# Patient Record
Sex: FEMALE | Race: WHITE | NOT HISPANIC OR LATINO | Employment: OTHER | URBAN - METROPOLITAN AREA
[De-identification: names, ages, dates, MRNs, and addresses within clinical notes are randomized per-mention and may not be internally consistent; named-entity substitution may affect disease eponyms.]

---

## 2017-01-09 ENCOUNTER — GENERIC CONVERSION - ENCOUNTER (OUTPATIENT)
Dept: OTHER | Facility: OTHER | Age: 62
End: 2017-01-09

## 2017-01-11 ENCOUNTER — GENERIC CONVERSION - ENCOUNTER (OUTPATIENT)
Dept: OTHER | Facility: OTHER | Age: 62
End: 2017-01-11

## 2017-01-12 ENCOUNTER — ALLSCRIPTS OFFICE VISIT (OUTPATIENT)
Dept: OTHER | Facility: OTHER | Age: 62
End: 2017-01-12

## 2017-01-19 ENCOUNTER — LAB REQUISITION (OUTPATIENT)
Dept: LAB | Facility: HOSPITAL | Age: 62
End: 2017-01-19
Payer: MEDICARE

## 2017-01-19 ENCOUNTER — ALLSCRIPTS OFFICE VISIT (OUTPATIENT)
Dept: OTHER | Facility: OTHER | Age: 62
End: 2017-01-19

## 2017-01-19 DIAGNOSIS — Z01.419 ENCOUNTER FOR GYNECOLOGICAL EXAMINATION WITHOUT ABNORMAL FINDING: ICD-10-CM

## 2017-01-19 PROCEDURE — G0145 SCR C/V CYTO,THINLAYER,RESCR: HCPCS | Performed by: OBSTETRICS & GYNECOLOGY

## 2017-01-23 LAB
LAB AP GYN PRIMARY INTERPRETATION: NORMAL
Lab: NORMAL

## 2017-01-24 ENCOUNTER — GENERIC CONVERSION - ENCOUNTER (OUTPATIENT)
Dept: OTHER | Facility: OTHER | Age: 62
End: 2017-01-24

## 2017-02-08 ENCOUNTER — GENERIC CONVERSION - ENCOUNTER (OUTPATIENT)
Dept: OTHER | Facility: OTHER | Age: 62
End: 2017-02-08

## 2017-03-08 ENCOUNTER — TRANSCRIBE ORDERS (OUTPATIENT)
Dept: ADMINISTRATIVE | Facility: HOSPITAL | Age: 62
End: 2017-03-08

## 2017-03-08 DIAGNOSIS — Z85.9 HISTORY OF CANCER: Primary | ICD-10-CM

## 2017-03-13 ENCOUNTER — HOSPITAL ENCOUNTER (OUTPATIENT)
Dept: CT IMAGING | Facility: HOSPITAL | Age: 62
Discharge: HOME | End: 2017-03-13
Payer: MEDICARE

## 2017-03-13 DIAGNOSIS — Z85.9 HISTORY OF CANCER: ICD-10-CM

## 2017-03-13 PROCEDURE — 71260 CT THORAX DX C+: CPT

## 2017-03-13 PROCEDURE — 74177 CT ABD & PELVIS W/CONTRAST: CPT

## 2017-03-13 RX ADMIN — IOHEXOL 100 ML: 350 INJECTION, SOLUTION INTRAVENOUS at 10:21

## 2017-03-16 ENCOUNTER — TRANSCRIBE ORDERS (OUTPATIENT)
Dept: LAB | Facility: CLINIC | Age: 62
End: 2017-03-16

## 2017-03-16 ENCOUNTER — APPOINTMENT (OUTPATIENT)
Dept: LAB | Facility: CLINIC | Age: 62
End: 2017-03-16
Payer: MEDICARE

## 2017-03-16 ENCOUNTER — OFFICE VISIT (OUTPATIENT)
Dept: LAB | Facility: CLINIC | Age: 62
End: 2017-03-16
Payer: MEDICARE

## 2017-03-16 ENCOUNTER — ALLSCRIPTS OFFICE VISIT (OUTPATIENT)
Dept: OTHER | Facility: OTHER | Age: 62
End: 2017-03-16

## 2017-03-16 DIAGNOSIS — C48.1 MALIGNANT NEOPLASM OF SPECIFIED PARTS OF PERITONEUM (HCC): Primary | ICD-10-CM

## 2017-03-16 DIAGNOSIS — C48.1 MALIGNANT NEOPLASM OF SPECIFIED PARTS OF PERITONEUM (HCC): ICD-10-CM

## 2017-03-16 DIAGNOSIS — C48.2 MALIGNANT NEOPLASM OF PERITONEUM (HCC): ICD-10-CM

## 2017-03-16 DIAGNOSIS — C48.2 MALIGNANT NEOPLASM OF PERITONEUM, UNSPECIFIED (HCC): ICD-10-CM

## 2017-03-16 DIAGNOSIS — E03.9 HYPOTHYROIDISM: ICD-10-CM

## 2017-03-16 LAB
ALBUMIN SERPL BCP-MCNC: 4.1 G/DL (ref 3.5–5)
ALP SERPL-CCNC: 122 U/L (ref 46–116)
ALT SERPL W P-5'-P-CCNC: 61 U/L (ref 12–78)
ANION GAP SERPL CALCULATED.3IONS-SCNC: 12 MMOL/L (ref 4–13)
AST SERPL W P-5'-P-CCNC: 27 U/L (ref 5–45)
ATRIAL RATE: 95 BPM
BACTERIA UR QL AUTO: ABNORMAL /HPF
BASOPHILS # BLD AUTO: 0.01 THOUSANDS/ΜL (ref 0–0.1)
BASOPHILS NFR BLD AUTO: 0 % (ref 0–1)
BILIRUB SERPL-MCNC: 0.2 MG/DL (ref 0.2–1)
BILIRUB UR QL STRIP: NEGATIVE
BUN SERPL-MCNC: 20 MG/DL (ref 5–25)
CALCIUM SERPL-MCNC: 9.6 MG/DL (ref 8.3–10.1)
CANCER AG125 SERPL-ACNC: 62 U/ML (ref 0–30)
CHLORIDE SERPL-SCNC: 102 MMOL/L (ref 100–108)
CLARITY UR: ABNORMAL
CO2 SERPL-SCNC: 26 MMOL/L (ref 21–32)
COLOR UR: YELLOW
CREAT SERPL-MCNC: 0.78 MG/DL (ref 0.6–1.3)
CREAT UR-MCNC: 138 MG/DL
EOSINOPHIL # BLD AUTO: 0.03 THOUSAND/ΜL (ref 0–0.61)
EOSINOPHIL NFR BLD AUTO: 1 % (ref 0–6)
ERYTHROCYTE [DISTWIDTH] IN BLOOD BY AUTOMATED COUNT: 16.1 % (ref 11.6–15.1)
GFR SERPL CREATININE-BSD FRML MDRD: >60 ML/MIN/1.73SQ M
GLUCOSE SERPL-MCNC: 106 MG/DL (ref 65–140)
GLUCOSE UR STRIP-MCNC: NEGATIVE MG/DL
HCT VFR BLD AUTO: 36.4 % (ref 34.8–46.1)
HGB BLD-MCNC: 11.9 G/DL (ref 11.5–15.4)
HGB UR QL STRIP.AUTO: NEGATIVE
KETONES UR STRIP-MCNC: NEGATIVE MG/DL
LEUKOCYTE ESTERASE UR QL STRIP: ABNORMAL
LYMPHOCYTES # BLD AUTO: 1.56 THOUSANDS/ΜL (ref 0.6–4.47)
LYMPHOCYTES NFR BLD AUTO: 30 % (ref 14–44)
MCH RBC QN AUTO: 34.2 PG (ref 26.8–34.3)
MCHC RBC AUTO-ENTMCNC: 32.7 G/DL (ref 31.4–37.4)
MCV RBC AUTO: 105 FL (ref 82–98)
MONOCYTES # BLD AUTO: 0.59 THOUSAND/ΜL (ref 0.17–1.22)
MONOCYTES NFR BLD AUTO: 11 % (ref 4–12)
NEUTROPHILS # BLD AUTO: 2.98 THOUSANDS/ΜL (ref 1.85–7.62)
NEUTS SEG NFR BLD AUTO: 58 % (ref 43–75)
NITRITE UR QL STRIP: POSITIVE
NON-SQ EPI CELLS URNS QL MICRO: ABNORMAL /HPF
P AXIS: 34 DEGREES
PH UR STRIP.AUTO: 5 [PH] (ref 4.5–8)
PLATELET # BLD AUTO: 173 THOUSANDS/UL (ref 149–390)
PMV BLD AUTO: 10.7 FL (ref 8.9–12.7)
POTASSIUM SERPL-SCNC: 4 MMOL/L (ref 3.5–5.3)
PR INTERVAL: 140 MS
PROT SERPL-MCNC: 7.8 G/DL (ref 6.4–8.2)
PROT UR STRIP-MCNC: NEGATIVE MG/DL
PROT UR-MCNC: 17 MG/DL
PROT/CREAT UR: 0.12 MG/G{CREAT} (ref 0–0.1)
QRS AXIS: 39 DEGREES
QRSD INTERVAL: 76 MS
QT INTERVAL: 338 MS
QTC INTERVAL: 424 MS
RBC # BLD AUTO: 3.48 MILLION/UL (ref 3.81–5.12)
RBC #/AREA URNS AUTO: ABNORMAL /HPF
SODIUM SERPL-SCNC: 140 MMOL/L (ref 136–145)
SP GR UR STRIP.AUTO: 1.02 (ref 1–1.03)
T WAVE AXIS: 35 DEGREES
TSH SERPL DL<=0.05 MIU/L-ACNC: 4.44 UIU/ML (ref 0.36–3.74)
UROBILINOGEN UR QL STRIP.AUTO: 0.2 E.U./DL
VENTRICULAR RATE: 95 BPM
WBC # BLD AUTO: 5.17 THOUSAND/UL (ref 4.31–10.16)
WBC #/AREA URNS AUTO: ABNORMAL /HPF
WBC CLUMPS # UR AUTO: ABNORMAL /UL

## 2017-03-16 PROCEDURE — 93005 ELECTROCARDIOGRAM TRACING: CPT

## 2017-03-16 PROCEDURE — 86304 IMMUNOASSAY TUMOR CA 125: CPT

## 2017-03-16 PROCEDURE — 84156 ASSAY OF PROTEIN URINE: CPT

## 2017-03-16 PROCEDURE — 85025 COMPLETE CBC W/AUTO DIFF WBC: CPT

## 2017-03-16 PROCEDURE — 82570 ASSAY OF URINE CREATININE: CPT

## 2017-03-16 PROCEDURE — 81001 URINALYSIS AUTO W/SCOPE: CPT

## 2017-03-16 PROCEDURE — 84443 ASSAY THYROID STIM HORMONE: CPT

## 2017-03-16 PROCEDURE — 80053 COMPREHEN METABOLIC PANEL: CPT

## 2017-03-16 PROCEDURE — 36415 COLL VENOUS BLD VENIPUNCTURE: CPT

## 2017-03-22 ENCOUNTER — ALLSCRIPTS OFFICE VISIT (OUTPATIENT)
Dept: OTHER | Facility: OTHER | Age: 62
End: 2017-03-22

## 2017-05-15 ENCOUNTER — HOSPITAL ENCOUNTER (INPATIENT)
Facility: HOSPITAL | Age: 62
LOS: 2 days | Discharge: HOME | DRG: 389 | End: 2017-05-17
Attending: OBSTETRICS & GYNECOLOGY | Admitting: OBSTETRICS & GYNECOLOGY
Payer: MEDICARE

## 2017-05-15 LAB
ALBUMIN SERPL BCP-MCNC: 3 G/DL (ref 3.5–5)
ALP SERPL-CCNC: 96 U/L (ref 46–116)
ALT SERPL W P-5'-P-CCNC: 43 U/L (ref 12–78)
ANION GAP SERPL CALCULATED.3IONS-SCNC: 9 MMOL/L (ref 4–13)
AST SERPL W P-5'-P-CCNC: 19 U/L (ref 5–45)
BASOPHILS # BLD AUTO: 0.01 THOUSANDS/ΜL (ref 0–0.1)
BASOPHILS NFR BLD AUTO: 0 % (ref 0–1)
BILIRUB SERPL-MCNC: 0.27 MG/DL (ref 0.2–1)
BUN SERPL-MCNC: 7 MG/DL (ref 5–25)
CALCIUM SERPL-MCNC: 8.2 MG/DL (ref 8.3–10.1)
CHLORIDE SERPL-SCNC: 106 MMOL/L (ref 100–108)
CO2 SERPL-SCNC: 24 MMOL/L (ref 21–32)
CREAT SERPL-MCNC: 0.63 MG/DL (ref 0.6–1.3)
EOSINOPHIL # BLD AUTO: 0.05 THOUSAND/ΜL (ref 0–0.61)
EOSINOPHIL NFR BLD AUTO: 1 % (ref 0–6)
ERYTHROCYTE [DISTWIDTH] IN BLOOD BY AUTOMATED COUNT: 13.3 % (ref 11.6–15.1)
GFR SERPL CREATININE-BSD FRML MDRD: >60 ML/MIN/1.73SQ M
GLUCOSE SERPL-MCNC: 89 MG/DL (ref 65–140)
HCT VFR BLD AUTO: 38.5 % (ref 34.8–46.1)
HGB BLD-MCNC: 12.9 G/DL (ref 11.5–15.4)
LACTATE SERPL-SCNC: 1 MMOL/L (ref 0.5–2)
LIPASE SERPL-CCNC: 148 U/L (ref 73–393)
LYMPHOCYTES # BLD AUTO: 1.4 THOUSANDS/ΜL (ref 0.6–4.47)
LYMPHOCYTES NFR BLD AUTO: 27 % (ref 14–44)
MAGNESIUM SERPL-MCNC: 2 MG/DL (ref 1.6–2.6)
MCH RBC QN AUTO: 33.4 PG (ref 26.8–34.3)
MCHC RBC AUTO-ENTMCNC: 33.5 G/DL (ref 31.4–37.4)
MCV RBC AUTO: 100 FL (ref 82–98)
MONOCYTES # BLD AUTO: 1.31 THOUSAND/ΜL (ref 0.17–1.22)
MONOCYTES NFR BLD AUTO: 25 % (ref 4–12)
NEUTROPHILS # BLD AUTO: 2.39 THOUSANDS/ΜL (ref 1.85–7.62)
NEUTS SEG NFR BLD AUTO: 47 % (ref 43–75)
NRBC BLD AUTO-RTO: 0 /100 WBCS
PHOSPHATE SERPL-MCNC: 2.3 MG/DL (ref 2.3–4.1)
PLATELET # BLD AUTO: 149 THOUSANDS/UL (ref 149–390)
PMV BLD AUTO: 11 FL (ref 8.9–12.7)
POTASSIUM SERPL-SCNC: 3.8 MMOL/L (ref 3.5–5.3)
PROT SERPL-MCNC: 6.7 G/DL (ref 6.4–8.2)
RBC # BLD AUTO: 3.86 MILLION/UL (ref 3.81–5.12)
SODIUM SERPL-SCNC: 139 MMOL/L (ref 136–145)
WBC # BLD AUTO: 5.18 THOUSAND/UL (ref 4.31–10.16)

## 2017-05-15 PROCEDURE — 84100 ASSAY OF PHOSPHORUS: CPT | Performed by: OBSTETRICS & GYNECOLOGY

## 2017-05-15 PROCEDURE — 83605 ASSAY OF LACTIC ACID: CPT | Performed by: OBSTETRICS & GYNECOLOGY

## 2017-05-15 PROCEDURE — 83690 ASSAY OF LIPASE: CPT | Performed by: OBSTETRICS & GYNECOLOGY

## 2017-05-15 PROCEDURE — 80053 COMPREHEN METABOLIC PANEL: CPT | Performed by: OBSTETRICS & GYNECOLOGY

## 2017-05-15 PROCEDURE — 83735 ASSAY OF MAGNESIUM: CPT | Performed by: OBSTETRICS & GYNECOLOGY

## 2017-05-15 PROCEDURE — 85025 COMPLETE CBC W/AUTO DIFF WBC: CPT | Performed by: OBSTETRICS & GYNECOLOGY

## 2017-05-15 RX ORDER — ONDANSETRON 2 MG/ML
4 INJECTION INTRAMUSCULAR; INTRAVENOUS EVERY 4 HOURS PRN
Status: DISCONTINUED | OUTPATIENT
Start: 2017-05-15 | End: 2017-05-17 | Stop reason: HOSPADM

## 2017-05-15 RX ORDER — LORAZEPAM 2 MG/ML
0.5 INJECTION INTRAMUSCULAR 2 TIMES DAILY PRN
Status: DISCONTINUED | OUTPATIENT
Start: 2017-05-15 | End: 2017-05-17 | Stop reason: HOSPADM

## 2017-05-15 RX ORDER — VALSARTAN 40 MG/1
40 TABLET ORAL DAILY
COMMUNITY

## 2017-05-15 RX ORDER — DEXTROSE, SODIUM CHLORIDE, AND POTASSIUM CHLORIDE 5; .45; .15 G/100ML; G/100ML; G/100ML
125 INJECTION INTRAVENOUS CONTINUOUS
Status: DISCONTINUED | OUTPATIENT
Start: 2017-05-15 | End: 2017-05-17

## 2017-05-15 RX ORDER — PANTOPRAZOLE SODIUM 40 MG/1
40 INJECTION, POWDER, FOR SOLUTION INTRAVENOUS
Status: DISCONTINUED | OUTPATIENT
Start: 2017-05-15 | End: 2017-05-17 | Stop reason: ALTCHOICE

## 2017-05-15 RX ADMIN — DEXTROSE, SODIUM CHLORIDE, AND POTASSIUM CHLORIDE 125 ML/HR: 5; .45; .15 INJECTION INTRAVENOUS at 20:32

## 2017-05-16 ENCOUNTER — APPOINTMENT (INPATIENT)
Dept: RADIOLOGY | Facility: HOSPITAL | Age: 62
DRG: 389 | End: 2017-05-16
Payer: MEDICARE

## 2017-05-16 PROBLEM — Z87.19 HISTORY OF BARRETT'S ESOPHAGUS: Status: ACTIVE | Noted: 2017-05-16

## 2017-05-16 PROBLEM — C48.2 PRIMARY MALIGNANT NEOPLASM OF PERITONEUM (HCC): Status: ACTIVE | Noted: 2017-05-16

## 2017-05-16 PROBLEM — K56.609 SMALL BOWEL OBSTRUCTION (HCC): Status: ACTIVE | Noted: 2017-05-16

## 2017-05-16 PROBLEM — E78.5 HYPERLIPIDEMIA: Status: ACTIVE | Noted: 2017-05-16

## 2017-05-16 PROBLEM — R97.1 ELEVATED CANCER ANTIGEN 125 (CA-125): Status: ACTIVE | Noted: 2017-05-16

## 2017-05-16 PROBLEM — E03.9 ACQUIRED HYPOTHYROIDISM: Status: ACTIVE | Noted: 2017-05-16

## 2017-05-16 PROBLEM — C78.6 PERITONEAL CARCINOMATOSIS (HCC): Status: ACTIVE | Noted: 2017-05-16

## 2017-05-16 LAB
ANION GAP SERPL CALCULATED.3IONS-SCNC: 8 MMOL/L (ref 4–13)
BASOPHILS # BLD AUTO: 0.01 THOUSANDS/ΜL (ref 0–0.1)
BASOPHILS NFR BLD AUTO: 0 % (ref 0–1)
BUN SERPL-MCNC: 8 MG/DL (ref 5–25)
CALCIUM SERPL-MCNC: 8.2 MG/DL (ref 8.3–10.1)
CHLORIDE SERPL-SCNC: 109 MMOL/L (ref 100–108)
CO2 SERPL-SCNC: 25 MMOL/L (ref 21–32)
CREAT SERPL-MCNC: 0.7 MG/DL (ref 0.6–1.3)
EOSINOPHIL # BLD AUTO: 0.04 THOUSAND/ΜL (ref 0–0.61)
EOSINOPHIL NFR BLD AUTO: 1 % (ref 0–6)
ERYTHROCYTE [DISTWIDTH] IN BLOOD BY AUTOMATED COUNT: 13.4 % (ref 11.6–15.1)
GFR SERPL CREATININE-BSD FRML MDRD: >60 ML/MIN/1.73SQ M
GLUCOSE SERPL-MCNC: 92 MG/DL (ref 65–140)
HCT VFR BLD AUTO: 37.5 % (ref 34.8–46.1)
HGB BLD-MCNC: 12.4 G/DL (ref 11.5–15.4)
LYMPHOCYTES # BLD AUTO: 1.68 THOUSANDS/ΜL (ref 0.6–4.47)
LYMPHOCYTES NFR BLD AUTO: 35 % (ref 14–44)
MCH RBC QN AUTO: 33.2 PG (ref 26.8–34.3)
MCHC RBC AUTO-ENTMCNC: 33.1 G/DL (ref 31.4–37.4)
MCV RBC AUTO: 101 FL (ref 82–98)
MONOCYTES # BLD AUTO: 1.11 THOUSAND/ΜL (ref 0.17–1.22)
MONOCYTES NFR BLD AUTO: 23 % (ref 4–12)
NEUTROPHILS # BLD AUTO: 1.99 THOUSANDS/ΜL (ref 1.85–7.62)
NEUTS SEG NFR BLD AUTO: 41 % (ref 43–75)
NRBC BLD AUTO-RTO: 0 /100 WBCS
PLATELET # BLD AUTO: 163 THOUSANDS/UL (ref 149–390)
PMV BLD AUTO: 11.4 FL (ref 8.9–12.7)
POTASSIUM SERPL-SCNC: 4.2 MMOL/L (ref 3.5–5.3)
RBC # BLD AUTO: 3.73 MILLION/UL (ref 3.81–5.12)
SODIUM SERPL-SCNC: 142 MMOL/L (ref 136–145)
WBC # BLD AUTO: 4.85 THOUSAND/UL (ref 4.31–10.16)

## 2017-05-16 PROCEDURE — C9113 INJ PANTOPRAZOLE SODIUM, VIA: HCPCS | Performed by: OBSTETRICS & GYNECOLOGY

## 2017-05-16 PROCEDURE — 80048 BASIC METABOLIC PNL TOTAL CA: CPT | Performed by: OBSTETRICS & GYNECOLOGY

## 2017-05-16 PROCEDURE — 49440 PLACE GASTROSTOMY TUBE PERC: CPT

## 2017-05-16 PROCEDURE — C1769 GUIDE WIRE: HCPCS

## 2017-05-16 PROCEDURE — 85025 COMPLETE CBC W/AUTO DIFF WBC: CPT | Performed by: OBSTETRICS & GYNECOLOGY

## 2017-05-16 RX ORDER — FONDAPARINUX SODIUM 2.5 MG/.5ML
2.5 INJECTION SUBCUTANEOUS EVERY 24 HOURS
Status: DISCONTINUED | OUTPATIENT
Start: 2017-05-16 | End: 2017-05-17 | Stop reason: HOSPADM

## 2017-05-16 RX ORDER — FENTANYL CITRATE 50 UG/ML
INJECTION, SOLUTION INTRAMUSCULAR; INTRAVENOUS CODE/TRAUMA/SEDATION MEDICATION
Status: COMPLETED | OUTPATIENT
Start: 2017-05-16 | End: 2017-05-16

## 2017-05-16 RX ORDER — MIDAZOLAM HYDROCHLORIDE 1 MG/ML
INJECTION INTRAMUSCULAR; INTRAVENOUS CODE/TRAUMA/SEDATION MEDICATION
Status: COMPLETED | OUTPATIENT
Start: 2017-05-16 | End: 2017-05-16

## 2017-05-16 RX ORDER — DIPHENHYDRAMINE HYDROCHLORIDE 50 MG/ML
INJECTION INTRAMUSCULAR; INTRAVENOUS CODE/TRAUMA/SEDATION MEDICATION
Status: COMPLETED | OUTPATIENT
Start: 2017-05-16 | End: 2017-05-16

## 2017-05-16 RX ORDER — CLINDAMYCIN PHOSPHATE 600 MG/50ML
600 INJECTION INTRAVENOUS ONCE
Status: COMPLETED | OUTPATIENT
Start: 2017-05-16 | End: 2017-05-16

## 2017-05-16 RX ADMIN — DEXTROSE, SODIUM CHLORIDE, AND POTASSIUM CHLORIDE 125 ML/HR: 5; .45; .15 INJECTION INTRAVENOUS at 02:35

## 2017-05-16 RX ADMIN — DEXTROSE, SODIUM CHLORIDE, AND POTASSIUM CHLORIDE 125 ML/HR: 5; .45; .15 INJECTION INTRAVENOUS at 19:16

## 2017-05-16 RX ADMIN — DEXTROSE, SODIUM CHLORIDE, AND POTASSIUM CHLORIDE 125 ML/HR: 5; .45; .15 INJECTION INTRAVENOUS at 10:11

## 2017-05-16 RX ADMIN — FENTANYL CITRATE 50 MCG: 50 INJECTION, SOLUTION INTRAMUSCULAR; INTRAVENOUS at 17:19

## 2017-05-16 RX ADMIN — MIDAZOLAM HYDROCHLORIDE 1 MG: 1 INJECTION, SOLUTION INTRAMUSCULAR; INTRAVENOUS at 17:19

## 2017-05-16 RX ADMIN — CLINDAMYCIN PHOSPHATE 600 MG: 12 INJECTION, SOLUTION INTRAMUSCULAR; INTRAVENOUS at 17:56

## 2017-05-16 RX ADMIN — GLUCAGON HYDROCHLORIDE 1 MG: KIT at 17:01

## 2017-05-16 RX ADMIN — MIDAZOLAM HYDROCHLORIDE 1 MG: 1 INJECTION, SOLUTION INTRAMUSCULAR; INTRAVENOUS at 17:23

## 2017-05-16 RX ADMIN — DIPHENHYDRAMINE HYDROCHLORIDE 25 MG: 50 INJECTION, SOLUTION INTRAMUSCULAR; INTRAVENOUS at 17:20

## 2017-05-16 RX ADMIN — FENTANYL CITRATE 50 MCG: 50 INJECTION, SOLUTION INTRAMUSCULAR; INTRAVENOUS at 17:31

## 2017-05-16 RX ADMIN — MIDAZOLAM HYDROCHLORIDE 1 MG: 1 INJECTION, SOLUTION INTRAMUSCULAR; INTRAVENOUS at 17:06

## 2017-05-16 RX ADMIN — FONDAPARINUX SODIUM 2.5 MG: 2.5 INJECTION, SOLUTION SUBCUTANEOUS at 21:50

## 2017-05-16 RX ADMIN — HYDROMORPHONE HYDROCHLORIDE 1 MG: 1 INJECTION, SOLUTION INTRAMUSCULAR; INTRAVENOUS; SUBCUTANEOUS at 21:56

## 2017-05-16 RX ADMIN — PANTOPRAZOLE SODIUM 40 MG: 40 INJECTION, POWDER, FOR SOLUTION INTRAVENOUS at 09:56

## 2017-05-16 RX ADMIN — FENTANYL CITRATE 50 MCG: 50 INJECTION, SOLUTION INTRAMUSCULAR; INTRAVENOUS at 17:06

## 2017-05-16 RX ADMIN — ONDANSETRON 4 MG: 2 INJECTION INTRAMUSCULAR; INTRAVENOUS at 10:33

## 2017-05-16 RX ADMIN — MIDAZOLAM HYDROCHLORIDE 1 MG: 1 INJECTION, SOLUTION INTRAMUSCULAR; INTRAVENOUS at 17:00

## 2017-05-16 RX ADMIN — HYDROMORPHONE HYDROCHLORIDE 1 MG: 1 INJECTION, SOLUTION INTRAMUSCULAR; INTRAVENOUS; SUBCUTANEOUS at 19:08

## 2017-05-16 RX ADMIN — ONDANSETRON 4 MG: 2 INJECTION INTRAMUSCULAR; INTRAVENOUS at 04:51

## 2017-05-16 RX ADMIN — IOHEXOL 5 ML: 300 INJECTION, SOLUTION INTRAVENOUS at 17:43

## 2017-05-16 RX ADMIN — FENTANYL CITRATE 50 MCG: 50 INJECTION, SOLUTION INTRAMUSCULAR; INTRAVENOUS at 17:00

## 2017-05-17 VITALS
DIASTOLIC BLOOD PRESSURE: 82 MMHG | RESPIRATION RATE: 20 BRPM | OXYGEN SATURATION: 98 % | HEART RATE: 113 BPM | SYSTOLIC BLOOD PRESSURE: 138 MMHG | WEIGHT: 167.77 LBS | BODY MASS INDEX: 29.73 KG/M2 | HEIGHT: 63 IN | TEMPERATURE: 98 F

## 2017-05-17 PROBLEM — K56.609 SMALL BOWEL OBSTRUCTION (HCC): Status: RESOLVED | Noted: 2017-05-16 | Resolved: 2017-05-17

## 2017-05-17 PROBLEM — C56.9 OVARIAN CA (HCC): Status: ACTIVE | Noted: 2017-05-17

## 2017-05-17 PROBLEM — Z93.4 JEJUNOSTOMY TUBE PRESENT (HCC): Status: ACTIVE | Noted: 2017-05-17

## 2017-05-17 PROBLEM — F41.9 ANXIETY: Chronic | Status: ACTIVE | Noted: 2017-05-17

## 2017-05-17 PROBLEM — Z93.1 GASTROINTESTINAL TUBE PRESENT (HCC): Status: ACTIVE | Noted: 2017-05-17

## 2017-05-17 PROCEDURE — 0DH67UZ INSERTION OF FEEDING DEVICE INTO STOMACH, VIA NATURAL OR ARTIFICIAL OPENING: ICD-10-PCS | Performed by: OBSTETRICS & GYNECOLOGY

## 2017-05-17 PROCEDURE — C9113 INJ PANTOPRAZOLE SODIUM, VIA: HCPCS | Performed by: OBSTETRICS & GYNECOLOGY

## 2017-05-17 RX ORDER — ACETAMINOPHEN 325 MG/1
325 TABLET ORAL EVERY 4 HOURS PRN
Status: DISCONTINUED | OUTPATIENT
Start: 2017-05-17 | End: 2017-05-17 | Stop reason: HOSPADM

## 2017-05-17 RX ORDER — SPIRONOLACTONE 50 MG/1
50 TABLET, FILM COATED ORAL DAILY
Status: DISCONTINUED | OUTPATIENT
Start: 2017-05-17 | End: 2017-05-17 | Stop reason: HOSPADM

## 2017-05-17 RX ORDER — PANTOPRAZOLE SODIUM 40 MG/1
40 TABLET, DELAYED RELEASE ORAL
Status: DISCONTINUED | OUTPATIENT
Start: 2017-05-17 | End: 2017-05-17 | Stop reason: HOSPADM

## 2017-05-17 RX ORDER — METOPROLOL SUCCINATE 50 MG/1
50 TABLET, EXTENDED RELEASE ORAL DAILY
Status: DISCONTINUED | OUTPATIENT
Start: 2017-05-17 | End: 2017-05-17 | Stop reason: HOSPADM

## 2017-05-17 RX ORDER — GABAPENTIN 400 MG/1
400 CAPSULE ORAL 3 TIMES DAILY
Status: DISCONTINUED | OUTPATIENT
Start: 2017-05-17 | End: 2017-05-17 | Stop reason: HOSPADM

## 2017-05-17 RX ORDER — OXYCODONE HYDROCHLORIDE AND ACETAMINOPHEN 5; 325 MG/1; MG/1
1 TABLET ORAL EVERY 4 HOURS PRN
Status: DISCONTINUED | OUTPATIENT
Start: 2017-05-17 | End: 2017-05-17 | Stop reason: HOSPADM

## 2017-05-17 RX ORDER — DOCUSATE SODIUM 100 MG/1
100 CAPSULE, LIQUID FILLED ORAL 2 TIMES DAILY
Status: DISCONTINUED | OUTPATIENT
Start: 2017-05-17 | End: 2017-05-17 | Stop reason: HOSPADM

## 2017-05-17 RX ORDER — OXYCODONE HYDROCHLORIDE AND ACETAMINOPHEN 5; 325 MG/1; MG/1
1 TABLET ORAL EVERY 4 HOURS PRN
Qty: 20 TABLET | Refills: 0 | Status: SHIPPED | OUTPATIENT
Start: 2017-05-17

## 2017-05-17 RX ORDER — CYCLOBENZAPRINE HCL 5 MG
5 TABLET ORAL 3 TIMES DAILY PRN
Status: DISCONTINUED | OUTPATIENT
Start: 2017-05-17 | End: 2017-05-17 | Stop reason: HOSPADM

## 2017-05-17 RX ORDER — VALSARTAN 40 MG/1
40 TABLET ORAL DAILY
Status: DISCONTINUED | OUTPATIENT
Start: 2017-05-17 | End: 2017-05-17 | Stop reason: HOSPADM

## 2017-05-17 RX ORDER — LORAZEPAM 0.5 MG/1
0.5 TABLET ORAL EVERY 6 HOURS PRN
Status: DISCONTINUED | OUTPATIENT
Start: 2017-05-17 | End: 2017-05-17 | Stop reason: HOSPADM

## 2017-05-17 RX ORDER — MELOXICAM 7.5 MG/1
15 TABLET ORAL DAILY
Status: DISCONTINUED | OUTPATIENT
Start: 2017-05-17 | End: 2017-05-17 | Stop reason: HOSPADM

## 2017-05-17 RX ADMIN — MELOXICAM 15 MG: 7.5 TABLET ORAL at 10:45

## 2017-05-17 RX ADMIN — OXYCODONE HYDROCHLORIDE AND ACETAMINOPHEN 1 TABLET: 5; 325 TABLET ORAL at 12:31

## 2017-05-17 RX ADMIN — OXYCODONE HYDROCHLORIDE AND ACETAMINOPHEN 1 TABLET: 5; 325 TABLET ORAL at 14:42

## 2017-05-17 RX ADMIN — ONDANSETRON 4 MG: 2 INJECTION INTRAMUSCULAR; INTRAVENOUS at 01:52

## 2017-05-17 RX ADMIN — METOPROLOL SUCCINATE 50 MG: 50 TABLET, EXTENDED RELEASE ORAL at 10:45

## 2017-05-17 RX ADMIN — SPIRONOLACTONE 50 MG: 50 TABLET ORAL at 10:45

## 2017-05-17 RX ADMIN — DEXTROSE, SODIUM CHLORIDE, AND POTASSIUM CHLORIDE 125 ML/HR: 5; .45; .15 INJECTION INTRAVENOUS at 03:13

## 2017-05-17 RX ADMIN — HYDROMORPHONE HYDROCHLORIDE 1 MG: 1 INJECTION, SOLUTION INTRAMUSCULAR; INTRAVENOUS; SUBCUTANEOUS at 07:44

## 2017-05-17 RX ADMIN — ONDANSETRON 4 MG: 2 INJECTION INTRAMUSCULAR; INTRAVENOUS at 07:45

## 2017-05-17 RX ADMIN — HYDROMORPHONE HYDROCHLORIDE 1 MG: 1 INJECTION, SOLUTION INTRAMUSCULAR; INTRAVENOUS; SUBCUTANEOUS at 01:46

## 2017-05-17 RX ADMIN — Medication 500 UNITS: at 14:42

## 2017-05-17 RX ADMIN — PANTOPRAZOLE SODIUM 40 MG: 40 INJECTION, POWDER, FOR SOLUTION INTRAVENOUS at 08:00

## 2017-05-18 ENCOUNTER — GENERIC CONVERSION - ENCOUNTER (OUTPATIENT)
Dept: OTHER | Facility: OTHER | Age: 62
End: 2017-05-18

## 2017-05-24 ENCOUNTER — GENERIC CONVERSION - ENCOUNTER (OUTPATIENT)
Dept: OTHER | Facility: OTHER | Age: 62
End: 2017-05-24

## 2017-05-25 ENCOUNTER — HOSPITAL ENCOUNTER (OUTPATIENT)
Dept: INTERVENTIONAL RADIOLOGY/VASCULAR | Facility: HOSPITAL | Age: 62
Discharge: HOME | End: 2017-05-25
Payer: MEDICARE

## 2017-05-25 ENCOUNTER — GENERIC CONVERSION - ENCOUNTER (OUTPATIENT)
Dept: OTHER | Facility: OTHER | Age: 62
End: 2017-05-25

## 2017-05-25 DIAGNOSIS — K56.609 SMALL BOWEL OBSTRUCTION (HCC): ICD-10-CM

## 2017-06-13 ENCOUNTER — ALLSCRIPTS OFFICE VISIT (OUTPATIENT)
Dept: OTHER | Facility: OTHER | Age: 62
End: 2017-06-13

## 2017-06-13 DIAGNOSIS — R10.12 LEFT UPPER QUADRANT PAIN: ICD-10-CM

## 2017-06-13 DIAGNOSIS — E07.9 DISORDER OF THYROID: ICD-10-CM

## 2017-06-19 ENCOUNTER — HOSPITAL ENCOUNTER (OUTPATIENT)
Dept: RADIOLOGY | Facility: HOSPITAL | Age: 62
Discharge: HOME | End: 2017-06-19
Attending: INTERNAL MEDICINE
Payer: MEDICARE

## 2017-06-19 ENCOUNTER — ALLSCRIPTS OFFICE VISIT (OUTPATIENT)
Dept: OTHER | Facility: OTHER | Age: 62
End: 2017-06-19

## 2017-06-19 DIAGNOSIS — R10.12 LEFT UPPER QUADRANT PAIN: ICD-10-CM

## 2017-06-19 PROCEDURE — 74240 X-RAY XM UPR GI TRC 1CNTRST: CPT

## 2017-06-20 ENCOUNTER — ALLSCRIPTS OFFICE VISIT (OUTPATIENT)
Dept: OTHER | Facility: OTHER | Age: 62
End: 2017-06-20

## 2017-06-23 ENCOUNTER — ALLSCRIPTS OFFICE VISIT (OUTPATIENT)
Dept: OTHER | Facility: OTHER | Age: 62
End: 2017-06-23

## 2017-08-02 ENCOUNTER — GENERIC CONVERSION - ENCOUNTER (OUTPATIENT)
Dept: OTHER | Facility: OTHER | Age: 62
End: 2017-08-02

## 2017-08-21 ENCOUNTER — GENERIC CONVERSION - ENCOUNTER (OUTPATIENT)
Dept: OTHER | Facility: OTHER | Age: 62
End: 2017-08-21

## 2017-08-28 ENCOUNTER — GENERIC CONVERSION - ENCOUNTER (OUTPATIENT)
Dept: OTHER | Facility: OTHER | Age: 62
End: 2017-08-28

## 2017-12-07 DIAGNOSIS — R92.2 INCONCLUSIVE MAMMOGRAM: ICD-10-CM

## 2017-12-07 DIAGNOSIS — Z12.31 ENCOUNTER FOR SCREENING MAMMOGRAM FOR MALIGNANT NEOPLASM OF BREAST: ICD-10-CM

## 2018-01-09 NOTE — MISCELLANEOUS
Assessment    1  Pleural effusion (511 9) (J90)   2  Benign essential hypertension (401 1) (I10)   3  Cervical disc herniation (722 0) (M50 20)   4  Hyperlipidemia (272 4) (E78 5)   5  Hypertension (401 9) (I10)   6  Thyroid nodule (241 0) (E04 1)    Plan  Cervical disc herniation    · Follow-up as previously scheduled Evaluation and Treatment  Follow-up  Status:  Complete  Done: 81ASF8412   Ordered; For: Cervical disc herniation; Ordered By: Vinay Baldwin Performed:  Due: 47LZG1843    Discussion/Summary  Discussion Summary:   She is currently stable chest x-ray today showed no pleural effusion  Reviewed all of her hospital data labs  She is following with endocrinology pulmonary cardiology and rheumatology some markers for rheumatoid arthritis for possible that she has known Raynaud's disease continue close follow-up  Chief Complaint  Chief Complaint Free Text Note Form: For follow-up      History of Present Illness  TCM Communication [facility]: The patient is being contacted for follow-up after hospitalization and NEEDS APPT  She was hospitalized at [facility]  The date of discharge: 5/31/16  Diagnosis: PLEURAL EFFUSION S/P THORACENTESIS  Medications reviewed and updated today  Follow-up appointments with other specialists: NEEDS LUNG AND ENDOCRINE F/U  Counseling was provided to the patient  DOING WELL  Communication performed and completed by JOSE SHAHID   HPI: She was hospitalized from May 28 until May 31  She had progressive weakness and shortness of breath over 2 weeks  She was seen in the emergency room found to have large pleural effusion on the right  She had 1200 mL tapped which showed transudate some suspicious cells were seen malignancy workup was negative now feeling a little weak but no shortness breath or coughing no chest pain fever dizziness her appetite has been good she's been sleeping well   The etiology of the pleura effusion has not been identified is found to have an incidental thyroid nodule Spinal Disc Herniation: The patient is being seen for a routine clinic follow-up of a spinal disc herniation  Symptoms:  neck pain, back pain, upper extremity pain and lower extremity pain  The patient is currently experiencing symptoms  Pleural Effusion (Brief): The patient is being seen for follow-up of a hospitalization for pleural effusion  The patient is currently asymptomatic  No associated symptoms are reported  Hyperlipidemia (Follow-Up): The patient states her hyperlipidemia has been under good control since the last visit  Comorbid Illnesses: hypertension  She has no significant interval events  Symptoms: The patient is currently asymptomatic  Associated symptoms include no focal neurologic deficits and no memory loss  Medications: the patient is adherent with her medication regimen  The patient is doing well with her hyperlipidemia goals  Hypertension (Follow-Up): The patient presents for follow-up of essential hypertension  The patient states she has been doing well with her blood pressure control since the last visit  She has no comorbid illnesses  She has no significant interval events  Symptoms: The patient is currently asymptomatic  Associated symptoms include no headache, no focal neurologic deficits and no memory loss  Home monitoring: The patient is not checking blood pressure at home  Review of Systems  Complete-Female:   Constitutional: No fever, no chills, feels well, no tiredness, no recent weight gain or weight loss  Eyes: No complaints of eye pain, no red eyes, no eyesight problems, no discharge, no dry eyes, no itching of eyes  ENT: no complaints of earache, no loss of hearing, no nose bleeds, no nasal discharge, no sore throat, no hoarseness  Cardiovascular: No complaints of slow heart rate, no fast heart rate, no chest pain, no palpitations, no leg claudication, no lower extremity edema  Respiratory: as noted in HPI     Gastrointestinal: No complaints of abdominal pain, no constipation, no nausea or vomiting, no diarrhea, no bloody stools  Genitourinary: No complaints of dysuria, no incontinence, no pelvic pain, no dysmenorrhea, no vaginal discharge or bleeding  Musculoskeletal: No complaints of arthralgias, no myalgias, no joint swelling or stiffness, no limb pain or swelling  Integumentary: No complaints of skin rash or lesions, no itching, no skin wounds, no breast pain or lump  Neurological: No complaints of headache, no confusion, no convulsions, no numbness, no dizziness or fainting, no tingling, no limb weakness, no difficulty walking  Psychiatric: Not suicidal, no sleep disturbance, no anxiety or depression, no change in personality, no emotional problems  Endocrine: No complaints of proptosis, no hot flashes, no muscle weakness, no deepening of the voice, no feelings of weakness  Hematologic/Lymphatic: No complaints of swollen glands, no swollen glands in the neck, does not bleed easily, does not bruise easily  ROS Reviewed:   ROS reviewed  Active Problems    1  Abnormal finding on mammography (793 80) (R92 8)   2  Benign essential hypertension (401 1) (I10)   3  Bilateral carpal tunnel syndrome (354 0) (G56 01,G56 02)   4  Breast cyst (610 0) (N60 09)   5  Cervical disc herniation (722 0) (M50 20)   6  Cervical radiculopathy (723 4) (M54 12)   7  Cervical spinal stenosis (723 0) (M48 02)   8  Dorsodynia (724 5) (M54 9)   9  Encounter for routine gynecological examination (V72 31) (Z01 419)   10  Encounter for screening mammogram for malignant neoplasm of breast (V76 12)    (Z12 31)   11  Exposure to cigarette smoke (V87 39) (Z77 22)   12  History of Malloy's esophagus (V12 79) (Z87 19)   13  Hypercholesterolemia (272 0) (E78 0)   14  Hyperlipidemia (272 4) (E78 5)   15  Hypertension (401 9) (I10)   16   Mass of mouth or throat (784 2) (R22 0)   17  Spondylosis of cervical region without myelopathy or radiculopathy (721 0) (M47 812) 18  Stress reaction, chronic (309 9) (F43 8)   19  Visit for screening mammogram (V76 12) (Z12 31)    Past Medical History    1  History of Arthritis (V13 4)   2  History of Dizziness and giddiness (780 4) (R42)   3  History of Dyspepsia (536 8) (K30)   4  History of fatigue (V13 89) (Z87 898)   5  History of shortness of breath (V13 89) (Z87 898)   6  History of Joint Swelling, Localized   7  History of Lack of coordination (781 3) (R27 9)   8  History of Reported A Previous Heart Murmur    Surgical History    1  History of Biopsy Of Cervix   2  History of Foot Surgery   3  History of Right Breast Lumpectomy   4  History of Surgery Left Foot Amputation Tuft Of Toe   5  History of Tonsillectomy  Surgical History Reviewed: The surgical history was reviewed and updated today  Family History  Father    1  Family history of Prostate cancer  Unknown    2  Family history of Prostate cancer  Family History    3  Family history of Arthritis (V17 7)   4  Family history of Cancer   5  Family history of Chronic Obstructive Pulmonary Disease   6  Family history of Dementia   7  Family history of Hypertension (V17 49)  Family History Reviewed: The family history was reviewed and updated today  Social History    · Denied: History of Drug Use   · Exposure to cigarette smoke (V87 39) (Z77 22)   · Marital History - Currently    · Never A Smoker   · Occasional alcohol use   · Retired From Work   · Sexually Active   · Denied: History of Sexually Active With Persons At Risk For HIV-related Disease  Social History Reviewed: The social history was reviewed and updated today  Current Meds   1  Aspir-Low 81 MG Oral Tablet Delayed Release; Take 1 tablet daily; Therapy: 64NMP2178- Recorded   2  Centrum Silver Oral Tablet; Take 1 tablet daily; Therapy: 40CTO8276- Recorded   3  Colace 50 MG CAPS; Take 1 capsule twice daily; Therapy: 02EVH4835 to Recorded   4   Cyclobenzaprine HCl - 5 MG Oral Tablet; TAKE 1 TABLET AT BEDTIME; Therapy: 63BXL7919- Recorded   5  Gabapentin 300 MG Oral Capsule; Take 2 Capsules at Bedtime; Therapy: 42FNP1546 to (Last Rx:14Mar2016)  Requested for: 04UNU4209 Ordered   6  Hydrochlorothiazide 12 5 MG Oral Tablet; take 2 tablet daily; Therapy: (Recorded:31Qcu2011) to Recorded   7  Klor-Con 10 10 MEQ Oral Tablet Extended Release; take 2 tablet daily; Therapy: (Recorded:10May2016) to Recorded   8  Lidoderm 5 % External Patch; APPLY INCH  PRN; Therapy: 84JVZ1625 to Recorded   9  LORazepam 0 5 MG Oral Tablet; bid prn; Therapy: 08UXU1435 to (Last ZJ:24MAU9118) Ordered   10  Meloxicam 7 5 MG Oral Tablet; Take 1 tablet twice daily  Requested for: 12Jan2016; Last    Rx:12Jan2016 Ordered   11  NIFEdipine ER 60 MG Oral Tablet Extended Release 24 Hour; TAKE 1 TABLET DAILY AS    DIRECTED; Therapy: 67Vte9008 to (Evaluate:20Mar2017)  Requested for: 25Mar2016; Last    Rx:25Mar2016 Ordered   12  Omeprazole 20 MG Oral Capsule Delayed Release; Take 1 capsule twice daily     Requested for: 12Jan2016; Last Rx:12Jan2016 Ordered   13  Pravastatin Sodium 10 MG Oral Tablet; TAKE 1 TABLET DAILY; Therapy: 20JIE5529 to (Evaluate:17Jun2016)  Requested for: 35ZLX2760; Last    Rx:23Jun2015 Ordered   14  Red Yeast Rice 600 MG Oral Tablet; Take 1 tablet daily; Therapy: 25WCK6777 to Recorded   15  Spironolactone 50 MG Oral Tablet; ONE TAB DAILY; Therapy: 41Yop7433 to (Last Rx:11Apr2016)  Requested for: 11Apr2016 Ordered   16  Super B Complex/Vitamin C Oral Tablet; Take 1 tablet daily; Therapy: 29WYV4047 to Recorded   17  Toprol XL 50 MG Oral Tablet Extended Release 24 Hour; Take 1 tablet daily; Therapy: 79BPG8067 to (Last Rx:12Jan2016)  Requested for: 12Jan2016 Ordered   18  Tramadol-Acetaminophen 37 5-325 MG Oral Tablet; TAKE 1 TABLET 3 TIMES DAILY AS    NEEDED; Therapy: 39LCA8791 to (Evaluate:07Jun2016); Last Rx:97Aeo6146 Ordered   19  Valsartan 80 MG Oral Tablet; 1/2 tab ad;     Therapy: 17NCF0951 to (Last Rx:39Akj8766)  Requested for: 25TGL6003 Ordered   20  Vitamin B-12 1000 MCG Oral Tablet; Take 1 tablet daily; Therapy: [phone]) to Recorded   21  Vitamin B-6 100 MG Oral Tablet; Take 1 tablet daily; Therapy: 80LJL3049 to Recorded   22  Vitamin D 2000 UNIT Oral Capsule; take 1 capsule daily; Therapy: 00TKN2164 to Recorded  Medication List Reviewed: The medication list was reviewed and updated today  Allergies    1  Penicillins    2  Animal dander - Cats   3  No Known Food Allergies    Vitals  Signs [Data Includes: Current Encounter]   Recorded: 97OQR6896 02:00PM   Temperature: 99 2 F  Heart Rate: 88  Systolic: 215  Diastolic: 70  Weight: 523 lb 4 00 oz    Physical Exam    Constitutional   General appearance: No acute distress, well appearing and well nourished  Eyes   Conjunctiva and lids: No swelling, erythema or discharge  Pupils and irises: Equal, round and reactive to light  Ears, Nose, Mouth, and Throat   External inspection of ears and nose: Normal     Otoscopic examination: Tympanic membranes translucent with normal light reflex  Canals patent without erythema  Nasal mucosa, septum, and turbinates: Normal without edema or erythema  Oropharynx: Normal with no erythema, edema, exudate or lesions  Pulmonary   Respiratory effort: No increased work of breathing or signs of respiratory distress  Auscultation of lungs: Clear to auscultation  Cardiovascular   Palpation of heart: Normal PMI, no thrills  Auscultation of heart: Normal rate and rhythm, normal S1 and S2, without murmurs  Examination of extremities for edema and/or varicosities: Normal     Carotid pulses: Normal     Abdomen   Abdomen: Non-tender, no masses  Liver and spleen: No hepatomegaly or splenomegaly  Lymphatic   Palpation of lymph nodes in neck: No lymphadenopathy  Musculoskeletal   Gait and station: Normal     Digits and nails: Normal without clubbing or cyanosis  Inspection/palpation of joints, bones, and muscles: Normal     Skin   Skin and subcutaneous tissue: Normal without rashes or lesions  Neurologic   Cranial nerves: Cranial nerves 2-12 intact  Reflexes: 2+ and symmetric  Sensation: No sensory loss      Psychiatric   Orientation to person, place, and time: Normal     Mood and affect: Normal          Future Appointments    Date/Time Provider Specialty Site   08/11/2016 11:45 AM Charleen Pimentel MD Neurology [facility] [address] [place] [address]   01/19/2017 10:00 AM Jaime De La Cruz MD Obstetrics/Gynecology [facility]   11/11/2016 11:00 AM Debbie Avilez DO Internal Medicine [facility]     Signatures   Electronically signed by : Lynn Braun, Juanis Reeder; Jun 7 2016  7:26PM EST                       (Author)    Electronically signed by : Reeda Gosselin, DO; Jun 8 2016  6:07PM EST                       (Co-author)

## 2018-01-10 NOTE — PROCEDURES
Procedures by Terrill Gaucher, MD at 11/1/2016  3:50 PM      Author:  Terrill Gaucher, MD Service:  Orthopedics Author Type:  Resident    Filed:  11/1/2016  3:52 PM Date of Service:  11/1/2016  3:50 PM Status:  Attested    :  Terrill Gaucher, MD (Resident)  Cosigner:  Val Zhang MD at 11/2/2016 12:44 PM    Attestation signed by Val Zhang MD at 11/2/2016 12:44 PM           Agree with the above note and plan of care by resident                                           Procedure- Orthopedics   Swapnil Nguyen 57 y o  female MRN: [id number]  Unit/Bed#: [facility] 621-01    Procedure: left knee aspiration and injection    After sterile preparation of the skin overlying the knee local anesthetic was provided with 5cc of 1% lidocaine  An 18 gauge needle was then inserted via a superior lateral portal   Approx 40 cc fluid was aspirated  It was normal appearing synovial fluid  that was straw colored without evidence of floating particulates  The syringe was then exchanged and a mixture of 2cc 1% lidocaine, 2cc 0 25% Marcaine, 2cc Betamethasone was injected into the knee joint  Sterile dressing was then applied  Pt tolerated  the procedure well and was neurovascularly intact both pre and post procedure  Patient has baseline neuropathy in left lower extremity       Lauren Charles MD             Received for:Provider  EPIC   Nov 2 2016 12:44PM [facility] Standard Time

## 2018-01-11 NOTE — MISCELLANEOUS
History of Present Illness  TCM Communication  [facility]: The patient is being contacted for follow-up after hospitalization  She was hospitalized at [place] [address]  The date of discharge: 11/4/16  She was discharged to home  Medications reviewed and updated today  Communication performed and completed by      Active Problems     1  Abnormal finding on mammography (793 80) (R92 8)   2  Bilateral carpal tunnel syndrome (354 0) (G56 03)   3  Breast cyst (610 0) (N60 09)   4  Cervical disc herniation (722 0) (M50 20)   5  Cervical radiculopathy (723 4) (M54 12)   6  Cervical spinal stenosis (723 0) (M48 02)   7  Dorsodynia (724 5) (M54 9)   8  Encounter for routine gynecological examination (V72 31) (Z01 419)   9  Encounter for screening mammogram for malignant neoplasm of breast (V76 12)   (Z12 31)   10  Exposure to cigarette smoke (V87 39) (Z77 22)   11  History of Malloy's esophagus (V12 79) (Z87 19)   12  Hypercholesterolemia (272 0) (E78 00)   13  Hyperlipidemia (272 4) (E78 5)   14  Hypertension (401 9) (I10)   15  Malignant pleural effusion (511 81) (J91 0)   16  Mass of mouth or throat (784 2) (R22 0)   17  Spondylosis of cervical region without myelopathy or radiculopathy (721 0) (M47 812)   18  Stress reaction, chronic (309 9) (F43 8)   19  Thyroid nodule (241 0) (E04 1)   20  Visit for screening mammogram (V76 12) (Z12 31)    Benign essential hypertension (401 1) (I10)       Carcinomatosis 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)       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 1)          Past Medical History    1  History of Acute UTI (599 0) (N39 0)   2  History of Arthritis (V13 4)   3  History of Dizziness and giddiness (780 4) (R42)   4  History of Dyspepsia (536 8) (K30)   5  History of fatigue (V13 89) (Z87 898)   6  History of pleural effusion (V12 69) (Z87 09)   7  History of shortness of breath (V13 89) (Z87 898)   8  History of Joint Swelling, Localized   9  History of Lack of coordination (781 3) (R27 9)   10   History of Reported A Previous Heart Murmur    Surgical History    1  History of Biopsy Of Cervix   2  History of Foot Surgery   3  History of Right Breast Lumpectomy   4  History of Surgery Left Foot Amputation Tuft Of Toe   5  History of Tonsillectomy    Family History  Father    1  Family history of Prostate cancer  Family History    2  Family history of Arthritis (V17 7)   3  Family history of Cancer   4  Family history of Chronic Obstructive Pulmonary Disease   5  Family history of Dementia   6  Family history of Hypertension (V17 49)    Social History    · Denied: History of Drug Use   · Exposure to cigarette smoke (V87 39) (Z77 22)   · Marital History - Currently    · Never A Smoker   · Occasional alcohol use   · Retired From Work   · Sexually Active   · Denied: History of Sexually Active With Persons At Risk For HIV-related Disease    Current Meds   1  CARBOplatin SOLN; As prescribed; Therapy: (Recorded:11Aug2016) to Recorded   2  Centrum Silver Oral Tablet; Take 1 tablet daily; Therapy: 90JRJ8676- Recorded   3  Colace 50 MG CAPS; Take 1 capsule twice daily; Therapy: 51RVV4554 to Recorded   4  Cyclobenzaprine HCl - 5 MG Oral Tablet; TAKE 1 TABLET AT BEDTIME; Therapy: 98XCH6895- Recorded   5  Gabapentin 100 MG Oral Capsule; TAKE 1 CAPSULE Bedtime  Requested for:   02ZSC8476; Last Rx:87Rfh4720 Ordered   6  Gabapentin 300 MG Oral Capsule; TAKE 1 CAPSULE Bedtime; Therapy: (Rama Churchill) to Recorded   7  Lidoderm 5 % External Patch; APPLY INCH  PRN; Therapy: 25CKZ0077 to Recorded   8  LORazepam 0 5 MG Oral Tablet; bid prn; Therapy: 06MMO1342 to (Last GX:08MZN5129) Ordered   9  Meloxicam 7 5 MG Oral Tablet; Take 1 tablet twice daily  Requested for: 12Jan2016; Last   Rx:12Jan2016 Ordered   10  Neomycin Sulfate 500 MG Oral Tablet; TAKE 2 TABLETS AT 1PM, 3PM,AND 10PM THE    DAY BEFORE SURGERY;     Therapy: 24UHV9942 to (797 9343)  Requested for: 29YQR6950; Last    Rx:07Oct2016 Ordered 11  Omeprazole 20 MG Oral Capsule Delayed Release; Take 1 capsule twice daily     Requested for: 12Jan2016; Last Rx:12Jan2016 Ordered   12  PACLitaxel 100 MG/16 7ML Intravenous Concentrate; As prescribed; Therapy: (Recorded:11Aug2016) to Recorded   13  Spironolactone 50 MG Oral Tablet; ONE TAB DAILY; Therapy: 67Dmw6280 to (Last Rx:11Apr2016)  Requested for: 11Apr2016 Ordered   14  Super B Complex/Vitamin C Oral Tablet; Take 1 tablet daily; Therapy: 61YVF5639 to Recorded   15  Toprol XL 50 MG Oral Tablet Extended Release 24 Hour; Take 1 tablet daily; Therapy: 22LFK0512 to (Last Rx:12Jan2016)  Requested for: 12Jan2016 Ordered   16  Tramadol-Acetaminophen 37 5-325 MG Oral Tablet; TAKE 1 TABLET 3 TIMES DAILY AS    NEEDED; Therapy: 43LVJ7964 to (Evaluate:27Jan2017); Last Rx:87Whx4322 Ordered   17  Vitamin B-12 1000 MCG Oral Tablet; Take 1 tablet daily; Therapy: [phone]) to Recorded   18  Vitamin B-6 100 MG Oral Tablet; Take 1 tablet daily; Therapy: 63GVJ4226 to Recorded   19  Vitamin D 2000 UNIT Oral Capsule; take 1 capsule daily; Therapy: 80PIJ4041 to Recorded    Allergies    1  Penicillins    2  Animal dander - Cats   3   No Known Food Allergies    Future Appointments    Date/Time Provider Specialty Site   01/12/2017 11:05 AM Christian Bella MD Neurology [facility]    01/19/2017 10:00 AM Pradeep Bower MD Obstetrics/Gynecology [facility]   12/21/2016 03:30 PM Hugh Quintana DO Internal Medicine [facility]     Signatures   Electronically signed by : Irving Sepulveda, [facility]; Dec  3 2016 11:39AM EST                       (Author)    Electronically signed by : GAGE Momin ; Dec  5 2016  9:23AM EST

## 2018-01-12 VITALS
WEIGHT: 163.13 LBS | DIASTOLIC BLOOD PRESSURE: 88 MMHG | OXYGEN SATURATION: 99 % | HEART RATE: 106 BPM | BODY MASS INDEX: 30.02 KG/M2 | SYSTOLIC BLOOD PRESSURE: 120 MMHG | HEIGHT: 62 IN

## 2018-01-12 VITALS
DIASTOLIC BLOOD PRESSURE: 78 MMHG | HEART RATE: 2 BPM | HEIGHT: 62 IN | BODY MASS INDEX: 32.25 KG/M2 | SYSTOLIC BLOOD PRESSURE: 118 MMHG | WEIGHT: 175.25 LBS

## 2018-01-12 NOTE — MISCELLANEOUS
History of Present Illness  TCM Communication [facility]: The patient is being contacted for follow-up after hospitalization and NEEDS APPT, DOCTOR DIDN'T USE TCM NOTE  She was hospitalized at [address] [place] [address]  The date of discharge: 9/141/6  Diagnosis: E COLI UTI, E COLI SEPSIS  Medications reviewed and updated today  She did not schedule a follow up appointment  Counseling was provided to the patient  DOING OK  Communication performed and completed by JOSE SHAHID      Active Problems     1  Abnormal finding on mammography (793 80) (R92 8)   2  Bilateral carpal tunnel syndrome (354 0) (G56 03)   3  Breast cyst (610 0) (N60 09)   4  Cervical disc herniation (722 0) (M50 20)   5  Cervical radiculopathy (723 4) (M54 12)   6  Cervical spinal stenosis (723 0) (M48 02)   7  Dorsodynia (724 5) (M54 9)   8  Encounter for routine gynecological examination (V72 31) (Z01 419)   9  Encounter for screening mammogram for malignant neoplasm of breast (V76 12)   (Z12 31)   10  Exposure to cigarette smoke (V87 39) (Z77 22)   11  History of Malloy's esophagus (V12 79) (Z87 19)   12  Hypercholesterolemia (272 0) (E78 00)   13  Hyperlipidemia (272 4) (E78 5)   14  Hypertension (401 9) (I10)   15  Malignant pleural effusion (511 81) (J91 0)   16  Mass of mouth or throat (784 2) (R22 0)   17  Spondylosis of cervical region without myelopathy or radiculopathy (721 0) (M47 812)   18  Stress reaction, chronic (309 9) (F43 8)   19  Thyroid nodule (241 0) (E04 1)   20  Visit for screening mammogram (V76 12) (Z12 31)    Benign essential hypertension (401 1) (I10)       Pleural effusion (511 9) (J90)       Carcinomatosis 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)       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 1)          Past Medical History    1  History of Arthritis (V13 4)   2  History of Dizziness and giddiness (780 4) (R42)   3  History of Dyspepsia (536 8) (K30)   4  History of fatigue (V13 89) (Z87 898)   5   History of shortness of breath (V13 89) (N21 591) 6  History of Joint Swelling, Localized   7  History of Lack of coordination (781 3) (R27 9)   8  History of Reported A Previous Heart Murmur    Surgical History    1  History of Biopsy Of Cervix   2  History of Foot Surgery   3  History of Right Breast Lumpectomy   4  History of Surgery Left Foot Amputation Tuft Of Toe   5  History of Tonsillectomy    Family History  Father    1  Family history of Prostate cancer  Family History    2  Family history of Arthritis (V17 7)   3  Family history of Cancer   4  Family history of Chronic Obstructive Pulmonary Disease   5  Family history of Dementia   6  Family history of Hypertension (V17 49)    Social History    · Denied: History of Drug Use   · Exposure to cigarette smoke (V87 39) (Z77 22)   · Marital History - Currently    · Never A Smoker   · Occasional alcohol use   · Retired From Work   · Sexually Active   · Denied: History of Sexually Active With Persons At Risk For HIV-related Disease    Current Meds   1  CARBOplatin SOLN; As prescribed; Therapy: (Recorded:11Aug2016) to Recorded   2  Centrum Silver Oral Tablet; Take 1 tablet daily; Therapy: 26NZC5913- Recorded   3  Colace 50 MG CAPS; Take 1 capsule twice daily; Therapy: 60GLO9603 to Recorded   4  Cyclobenzaprine HCl - 5 MG Oral Tablet; TAKE 1 TABLET AT BEDTIME; Therapy: 68VAE6572- Recorded   5  Gabapentin 100 MG Oral Capsule; TAKE 1 CAPSULE Bedtime  Requested for:   77Kne7907; Last Rx:08Gjq9913; Status: ACTIVE - Transmit to Pharmacy - Awaiting   Verification Ordered   6  Hydrochlorothiazide 12 5 MG Oral Tablet; take 2 tablet daily; Therapy: (Recorded:24Nev6771) to Recorded   7  Klor-Con 10 10 MEQ Oral Tablet Extended Release; take 2 tablet daily; Therapy: (Recorded:17Tbd7138) to Recorded   8  Lidoderm 5 % External Patch; APPLY INCH  PRN; Therapy: 48VUI4410 to Recorded   9  LORazepam 0 5 MG Oral Tablet; bid prn; Therapy: 41USS1642 to (Last KP:18VBH2670) Ordered   10   Meloxicam 7 5 MG Oral Tablet; Take 1 tablet twice daily  Requested for: 12Jan2016; Last    Rx:12Jan2016 Ordered   11  NIFEdipine ER 60 MG Oral Tablet Extended Release 24 Hour; TAKE 1 TABLET DAILY AS    DIRECTED; Therapy: 35Jlu9461 to (Evaluate:20Mar2017)  Requested for: 25Mar2016; Last    Rx:25Mar2016 Ordered   12  Omeprazole 20 MG Oral Capsule Delayed Release; Take 1 capsule twice daily     Requested for: 12Jan2016; Last Rx:12Jan2016 Ordered   13  PACLitaxel 100 MG/16 7ML Intravenous Concentrate; As prescribed; Therapy: (Recorded:11Aug2016) to Recorded   14  Spironolactone 50 MG Oral Tablet; ONE TAB DAILY; Therapy: 73Tbm2181 to (Last Rx:11Apr2016)  Requested for: 11Apr2016 Ordered   15  Super B Complex/Vitamin C Oral Tablet; Take 1 tablet daily; Therapy: 37PWE6798 to Recorded   16  Toprol XL 50 MG Oral Tablet Extended Release 24 Hour; Take 1 tablet daily; Therapy: 90AFG0316 to (Last Rx:12Jan2016)  Requested for: 12Jan2016 Ordered   17  Tramadol-Acetaminophen 37 5-325 MG Oral Tablet; TAKE 1 TABLET 3 TIMES DAILY AS    NEEDED; Therapy: 33RAW0501 to (Evaluate:07Jun2016); Last Rx:76Bic7709 Ordered   18  Valsartan 80 MG Oral Tablet; 1/2 tab ad; Therapy: 90XIQ5646 to (Last Rx:15Jan2016)  Requested for: 91RAA8948 Ordered   19  Vitamin B-12 1000 MCG Oral Tablet; Take 1 tablet daily; Therapy: (9397 8817) to Recorded   20  Vitamin B-6 100 MG Oral Tablet; Take 1 tablet daily; Therapy: 96MPC8855 to Recorded   21  Vitamin D 2000 UNIT Oral Capsule; take 1 capsule daily; Therapy: 45GLS5654 to Recorded    Allergies    1  Penicillins    2  Animal dander - Cats   3   No Known Food Allergies    Future Appointments    Date/Time Provider Specialty Site   01/12/2017 11:05 AM Cory Vega MD Neurology [facility]   01/19/2017 10:00 AM Lucero Grayson MD Obstetrics/Gynecology [facility]   11/11/2016 11:00 AM Marlena Martinez DO Internal Medicine [id number] [facility]   10/07/2016 02:15 PM Sera Gomez MD Gynecological Oncology [facility]     Signatures   Electronically signed by : [facility]; Oct  6 2016 10:49AM EST                       (Author)    Electronically signed by : Jesse Caasrez DO; Oct  6 2016  4:02PM EST                       (Co-author)

## 2018-01-13 VITALS
WEIGHT: 172 LBS | BODY MASS INDEX: 30.48 KG/M2 | RESPIRATION RATE: 18 BRPM | HEIGHT: 63 IN | DIASTOLIC BLOOD PRESSURE: 84 MMHG | HEART RATE: 80 BPM | SYSTOLIC BLOOD PRESSURE: 118 MMHG

## 2018-01-13 VITALS
BODY MASS INDEX: 29.44 KG/M2 | SYSTOLIC BLOOD PRESSURE: 118 MMHG | HEIGHT: 62 IN | DIASTOLIC BLOOD PRESSURE: 66 MMHG | HEART RATE: 72 BPM | WEIGHT: 160 LBS

## 2018-01-13 VITALS
HEIGHT: 62 IN | SYSTOLIC BLOOD PRESSURE: 120 MMHG | DIASTOLIC BLOOD PRESSURE: 78 MMHG | HEART RATE: 78 BPM | WEIGHT: 175.31 LBS | RESPIRATION RATE: 18 BRPM | TEMPERATURE: 97.8 F | BODY MASS INDEX: 32.26 KG/M2

## 2018-01-13 NOTE — MISCELLANEOUS
History of Present Illness  TCM Communication [facility]: The patient is being contacted for follow-up after hospitalization and needs appt, patient canceled SARAH appointment  She was hospitalized at [facility]  The date of admission: 5/15, date of discharge: 5/17  Diagnosis: sbo  She was discharged to home  Medications reviewed and updated today  She did not schedule a follow up appointment  Follow-up appointments with other specialists: gyn/onc  Counseling was provided to the patient  doing ok  Communication performed and completed by bjorn lemus      Active Problems    1  Abnormal finding on mammography (793 80) (R92 8)   2  Acquired hypothyroidism (244 9) (E03 9)   3  Benign essential hypertension (401 1) (I10)   4  Bilateral carpal tunnel syndrome (354 0) (G56 03)   5  Breast cyst (610 0) (N60 09)   6  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 1)   7  Carcinomatosis peritonei (197 6,199 1) (C78 6,C80 1)   8  Cervical disc herniation (722 0) (M50 20)   9  Cervical radiculopathy (723 4) (M54 12)   10  Cervical spinal stenosis (723 0) (M48 02)   11  Constipation (564 00) (K59 00)   12  Dense breasts (793 82) (R92 2)   13  Dorsodynia (724 5) (M54 9)   14  Elevated CA-125 (795 82) (R97 1)   15  Encounter for routine gynecological examination (V72 31) (Z01 419)   16  Encounter for screening mammogram for malignant neoplasm of breast (V76 12)    (Z12 31)   17  Exposure to cigarette smoke (V87 39) (Z77 22)   18  History of Malloy's esophagus (V12 79) (Z87 19)   19  Hypercholesterolemia (272 0) (E78 00)   20  Hyperlipidemia (272 4) (E78 5)   21  Hypertension (401 9) (I10)   22  Malignant pleural effusion (511 81) (J91 0)   23  Mass of mouth or throat (784 2)   24  Pap smear, as part of routine gynecological examination (V76 2) (Z01 419)   25  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)   26  Spondylosis of cervical region without myelopathy or radiculopathy (721 0) (M47 812)   27   Stress reaction, chronic (309 9) (F43 8)   28  Thyroid nodule (241 0) (E04 1)   29  Visit for screening mammogram (V76 12) (Z12 31)    Past Medical History    1  History of Acquired hypothyroidism (244 9) (E03 9)   2  History of Acute UTI (599 0) (N39 0)   3  History of Arthritis (V13 4)   4  History of Dizziness and giddiness (780 4) (R42)   5  History of Dyspepsia (536 8) (K30)   6  History of fatigue (V13 89) (Z87 898)   7  History of pleural effusion (V12 69) (Z87 09)   8  History of shortness of breath (V13 89) (Z87 898)   9  History of Joint Swelling, Localized   10  History of Lack of coordination (781 3) (R27 9)   11  History of Reported A Previous Heart Murmur    Surgical History    1  History of Biopsy Of Cervix   2  History of Foot Surgery   3  History of Hysterectomy   4  History of Resection Of Ovarian Malig  + BSO, Omentectomy, Debulking   5  History of Right Breast Lumpectomy   6  History of Surgery Left Foot Amputation Tuft Of Toe   7  History of Tonsillectomy    Family History  Father    1  Family history of Prostate cancer  Family History    2  Family history of Arthritis (V17 7)   3  Family history of Cancer   4  Family history of Chronic Obstructive Pulmonary Disease   5  Family history of Dementia   6  Family history of Hypertension (V17 49)    Social History    · Denied: History of Drug Use   · Exposure to cigarette smoke (V87 39) (Z77 22)   · Marital History - Currently    · Never A Smoker   · Occasional alcohol use   · Retired From Work   · Sexually Active   · Denied: History of Sexually Active With Persons At Risk For HIV-related Disease    Current Meds   1  CARBOplatin SOLN; INFUSE ML  AS DIRECTED; Therapy: (Recorded:12Jan2017) to Recorded   2  Centrum Silver Oral Tablet; Take 1 tablet daily; Therapy: 14ZTQ8607- Recorded   3  Colace 50 MG CAPS; Take 1 capsule twice daily; Therapy: 55JVA3388 to Recorded   4  Cyclobenzaprine HCl - 5 MG Oral Tablet; TAKE 1 TABLET AT BEDTIME;    Therapy: 26LKO3976- Recorded   5  Gabapentin 400 MG Oral Capsule; TAKE 1 CAPSULE AT BEDTIME  Requested for:   23Nov2016; Last Rx:23Nov2016 Ordered   6  Lactulose 20 GM/30ML Oral Solution; PER MEDENT~ 30ML PO QD/PRN; Therapy: 71Gsb4867 to (Last Rx:63Vif4512)  Requested for: 22Dec2016; Status:   [address] [facility] [place] Ver[place] Ordered   7  Lidoderm 5 % External Patch; APPLY INCH  PRN; Therapy: 08XBW1315 to Recorded   8  LORazepam 0 5 MG Oral Tablet; bid prn; Therapy: 73IRH4290 to (Last Rx:22Mar2017) Ordered   9  Meloxicam 15 MG Oral Tablet; TAKE 1 TABLET DAILY  Requested for: 22Dec2016; Last   Rx:85Nxp2679 Ordered   10  Omeprazole 20 MG Oral Capsule Delayed Release; Take 1 capsule twice daily     Requested for: 22Dec2016; Last Rx:22Dec2016 Ordered   11  PACLitaxel 100 MG/16 7ML Intravenous Concentrate; As prescribed; Therapy: (Recorded:11Aug2016) to Recorded   12  Spironolactone 50 MG Oral Tablet; ONE TAB DAILY; Therapy: 00Uut1441 to (Last Rx:24Jan2017)  Requested for: 24Jan2017 Ordered   13  Super B Complex/Vitamin C Oral Tablet; Take 1 tablet daily; Therapy: 11JAD5137 to Recorded   14  Toprol XL 50 MG Oral Tablet Extended Release 24 Hour; Take 1 tablet daily; Therapy: 75LLT5841 to (Last Rx:24Jan2017)  Requested for: 24Jan2017 Ordered   15  Tramadol-Acetaminophen 37 5-325 MG Oral Tablet; TAKE 1 TABLET 3 TIMES DAILY AS    NEEDED; Therapy: 58ZOA5040 to (Evaluate:27Jan2017); Last Rx:56Kiw0902 Ordered   16  Valsartan 40 MG Oral Tablet; take 1 tablet every day; Therapy: 64MMY2048 to (Last Rx:06Jan2017)  Requested for: 62AEE5681 Ordered   17  Vitamin B-12 1000 MCG Oral Tablet; Take 1 tablet daily; Therapy: [phone]) to Recorded   18  Vitamin B-6 100 MG Oral Tablet; Take 1 tablet daily; Therapy: 40ADR0904 to Recorded   19  Vitamin D 2000 UNIT Oral Capsule; take 1 capsule daily; Therapy: 06YCN7401 to Recorded    Allergies    1  Penicillins    2  Animal dander - Cats   3   No Known Food Allergies    Future Appointments    Date/Time Provider Specialty Site   06/19/2017 02:00 PM Paul Herrera MD [facility]   02/01/2018 10:20 AM Lionel Cline MD Obstetrics/Gynecology [facility] OB[facility]N [facility]   06/20/2017 04:15 PM Fili Fox DO Internal Medicine [facility] AND WOMEN'S [facility]   07/26/2017 02:00 PM Fili Fox DO Internal Medicine [facility]     Signatures   Electronically signed by : Thuan Valladares [facility]; Jun 16 2017  1:34PM EST                       (Author)    Electronically signed by : Bee Boykin DO; Jun 16 2017  2:05PM EST                       (Co-author)

## 2018-01-13 NOTE — PROGRESS NOTES
Discussion/Summary  Social Work-Discussion Summary [facility]: Patient is being seen for a distress screenning assessment  Received and reviewed Pt's Distress Thermometer completed by Pt on 11/17/2016 in the [place] office  Pt rated her distress at a 6/10 and listed transportation, depression, and fears as psychosocial problems  Based upon Pt's distress score, Cancer Counselor Jesus Frausto contacted her  If Pt expresses further psychosocial needs, Cancer Counselor is available to provide counseling and intervention         Signatures   Electronically signed by : Laura Mclaughlin, [address] [place]; Feb 8 2017  2:24PM EST                       (Author)

## 2018-01-14 VITALS
WEIGHT: 169 LBS | BODY MASS INDEX: 31.1 KG/M2 | SYSTOLIC BLOOD PRESSURE: 118 MMHG | HEIGHT: 62 IN | DIASTOLIC BLOOD PRESSURE: 68 MMHG

## 2018-01-14 VITALS
HEIGHT: 62 IN | BODY MASS INDEX: 31.47 KG/M2 | SYSTOLIC BLOOD PRESSURE: 136 MMHG | WEIGHT: 171 LBS | DIASTOLIC BLOOD PRESSURE: 82 MMHG

## 2018-01-14 VITALS
WEIGHT: 163.25 LBS | BODY MASS INDEX: 30.04 KG/M2 | DIASTOLIC BLOOD PRESSURE: 76 MMHG | SYSTOLIC BLOOD PRESSURE: 122 MMHG | HEIGHT: 62 IN

## 2018-01-15 NOTE — PROCEDURES
Procedures by Mathieu Huston MD at 5/25/2017   5:47 PM      Author:  Mathieu Huston MD Service:  Interventional Radiology  Author Type:  Physician     Filed:  5/25/2017  5:48 PM Date of Service:  5/25/2017  5:47 PM Status:  Signed     :  Mathieu Huston MD (Physician)            Procedures     Patient seen in IR suite with complaints of pain at g tube site  One of the t fastener sutures had not been released  After releasing this suture, patients symptoms improved  Dressing was returned and  was provided concerning g tube care              Received for:Provider  EPIC   May 25 2017  5:48PM [facility] Standard Time

## 2018-01-15 NOTE — MISCELLANEOUS
History of Present Illness  TCM Communication [facility]: [facility] records were reviewed  The date of admission: 8/22/17, date of discharge: 8/25/17  Diagnosis: pleural effusion  She was discharged to home  She did not schedule a follow up appointment  Follow-up appointments with other specialists: hospice  Communication performed and completed by      Active Problems    1  Abdominal discomfort in left upper quadrant (789 02) (R10 12)   2  Abnormal finding on mammography (793 80) (R92 8)   3  Acquired hypothyroidism (244 9) (E03 9)   4  Benign essential hypertension (401 1) (I10)   5  Bilateral carpal tunnel syndrome (354 0) (G56 03)   6  Breast cyst (610 0) (N60 09)   7  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 1)   8  Carcinomatosis peritonei (197 6,199 1) (C78 6,C80 1)   9  Cervical disc herniation (722 0) (M50 20)   10  Cervical radiculopathy (723 4) (M54 12)   11  Cervical spinal stenosis (723 0) (M48 02)   12  Constipation (564 00) (K59 00)   13  Dense breasts (793 82) (R92 2)   14  Dorsodynia (724 5) (M54 9)   15  Elevated CA-125 (795 82) (R97 1)   16  Encounter for routine gynecological examination (V72 31) (Z01 419)   17  Encounter for screening mammogram for malignant neoplasm of breast (V76 12)    (Z12 31)   18  Exposure to cigarette smoke (V87 39) (Z77 22)   19  History of Malloy's esophagus (V12 79) (Z87 19)   20  Hypercholesterolemia (272 0) (E78 00)   21  Hyperlipidemia (272 4) (E78 5)   22  Hypertension (401 9) (I10)   23  Malignant pleural effusion (511 81) (J91 0)   24  Mass of mouth or throat (784 2)   25  Pap smear, as part of routine gynecological examination (V76 2) (Z01 419)   26  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)   27  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,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,T45  1X5A)   28  Spondylosis of cervical region without myelopathy or radiculopathy (721 0) (M47 812)   29  Stress reaction, chronic (309 9) (F43 8)   30   Thyroid disorder (246 9) (E07 9)   31  Thyroid nodule (241 0) (E04 1)   32  Visit for screening mammogram (V76 12) (Z12 31)    Past Medical History    1  History of Acquired hypothyroidism (244 9) (E03 9)   2  History of Acute UTI (599 0) (N39 0)   3  History of Arthritis (V13 4)   4  History of Dizziness and giddiness (780 4) (R42)   5  History of Dyspepsia (536 8) (K30)   6  History of fatigue (V13 89) (Z87 898)   7  History of pleural effusion (V12 69) (Z87 09)   8  History of shortness of breath (V13 89) (Z87 898)   9  History of Joint Swelling, Localized   10  History of Lack of coordination (781 3) (R27 9)   11  History of Reported A Previous Heart Murmur    Surgical History    1  History of Biopsy Of Cervix   2  History of Foot Surgery   3  History of G-Tube Insertion Date   4  History of Hysterectomy   5  History of Resection Of Ovarian Malig  + BSO, Omentectomy, Debulking   6  History of Right Breast Lumpectomy   7  History of Surgery Left Foot Amputation Tuft Of Toe   8  History of Tonsillectomy    Family History  Father    1  Family history of Prostate cancer  Family History    2  Family history of Arthritis (V17 7)   3  Family history of Cancer   4  Family history of Chronic Obstructive Pulmonary Disease   5  Family history of Dementia   6  Family history of Hypertension (V17 49)    Social History    · Denied: History of Drug Use   · Exposure to cigarette smoke (V87 39) (Z77 22)   · Marital History - Currently    · Never A Smoker   · Occasional alcohol use   · Retired From Work   · Sexually Active   · Denied: History of Sexually Active With Persons At Risk For HIV-related Disease    Current Meds   1  CARBOplatin SOLN; INFUSE ML  AS DIRECTED; Therapy: (Recorded:12Jan2017) to Recorded   2  Centrum Silver Oral Tablet; Take 1 tablet daily; Therapy: 94CSS7289- Recorded   3  Colace 50 MG CAPS; Take 1 capsule twice daily; Therapy: 85CBG6285 to Recorded   4   Cyclobenzaprine HCl - 10 MG Oral Tablet; TAKE 1 TABLET Bedtime; Therapy: 37ZQS4647 to (Evaluate:03Sep2017)  Requested for: 26CYQ0005; Last   Rx:05Jun2017 Ordered   5  Gabapentin 100 MG Oral Capsule; TAKE 2 CAPSULE Daily; Therapy: 03CAB6020 to (Evaluate:25Sai5092)  Requested for: 66TTS8175; Last   Rx:19Jun2017 Ordered   6  Gabapentin 400 MG Oral Capsule; TAKE 1 CAPSULE AT BEDTIME  Requested for:   70JFF9376; Last Rx:05Jun2017 Ordered   7  Lactulose 20 GM/30ML Oral Solution; PER MEDENT~ 30ML PO QD/PRN; Therapy: 89Tnp1151 to (Last Rx:22Dec2016)  Requested for: 35Pto9337; Status:   [address] guillermo Iraheta Ordered   8  Levothyroxine Sodium 50 MCG Oral Tablet; TAKE 1 TABLET DAILY  Requested for:   20Jun2017; Last Rx:20Jun2017 Ordered   9  Lidoderm 5 % External Patch; APPLY INCH  PRN; Therapy: 36RFK8084 to Recorded   10  LORazepam 0 5 MG Oral Tablet; bid prn; Therapy: 04LQC8165 to (Last Rx:22Mar2017) Ordered   11  Meloxicam 15 MG Oral Tablet; TAKE 1 TABLET DAILY  Requested for: 91YGH3010; Last    Rx:05Jun2017 Ordered   12  Omeprazole 20 MG Oral Capsule Delayed Release; Take 1 capsule twice daily     Requested for: 22Dec2016; Last Rx:22Dec2016 Ordered   13  PACLitaxel 100 MG/16 7ML Intravenous Concentrate; As prescribed; Therapy: (Recorded:79Muu8537) to Recorded   14  Spironolactone 50 MG Oral Tablet; ONE TAB DAILY; Therapy: 89Mlk5890 to (Last Rx:24Jan2017)  Requested for: 24Jan2017 Ordered   15  Super B Complex/Vitamin C Oral Tablet; Take 1 tablet daily; Therapy: 23EFR0494 to Recorded   16  Toprol XL 50 MG Oral Tablet Extended Release 24 Hour; Take 1 tablet daily; Therapy: 28KLX7521 to (Last Rx:24Jan2017)  Requested for: 24Jan2017 Ordered   17  Tramadol-Acetaminophen 37 5-325 MG Oral Tablet; TAKE 1 TABLET 3 TIMES DAILY AS    NEEDED; Therapy: 10QGG2354 to (Evaluate:27Jan2017); Last Rx:15Ses9076 Ordered   18  Valsartan 40 MG Oral Tablet; take 1 tablet every day;     Therapy: 64ZXS6668 to (Last Rx:06Jan2017)  Requested for: 78ETW5310 Ordered   19  Vitamin B-12 1000 MCG Oral Tablet; Take 1 tablet daily; Therapy: [phone]) to Recorded   20  Vitamin B-6 100 MG Oral Tablet; Take 1 tablet daily; Therapy: 36HUU3816 to Recorded   21  Vitamin D 2000 UNIT Oral Capsule; take 1 capsule daily; Therapy: 36RVV6897 to Recorded    Allergies    1  Penicillins    2  Animal dander - Cats   3   No Known Food Allergies    Future Appointments    Date/Time Provider Specialty Site   10/31/2017 12:40 PM Margoth Beebe MD Neurology [facility]    02/01/2018 10:20 AM Raza Lugo MD Obstetrics/Gynecology [facility] OB/GYN [facility] AND SURGICAL [facility]   09/26/2017 11:00 AM Irving Hoffmann DO Internal Medicine [facility] OF [facility]     Signatures   Electronically signed by : Francisco Higginbotham, ; Aug 28 2017  6:58PM EST                       (Author)    Electronically signed by : Gabe Li DO; Aug 29 2017 12:25PM EST                       (Co-author)

## 2018-01-16 NOTE — MISCELLANEOUS
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, 2016 after malignant pleural effusion found  Care with Dr Elysia Gray and had exploratory laparoscopy, laparotomy, resection small bowel tumor, ASHLEY, BSO, omentectomy and cysto with 3 course of adjuvant chemotherpy done with good effect, and further chemo planned  Pt with no bleeding, pain, urinary nor bowel complaints  Now wound healing well  Energy fair  As well, has had in the past considerable cx (neck) stenosis and other associated radiculopathy and pathology, hypertension and hyperlipidemia, Raynaud's phenomena  She has had GI reflux and concerns about Malloy's esophagitis  Pap normal in 2012, as well as negative HRHPV with remote hx of high grade cx dysplasia before ASHLEY  Pap of cuff since negative  Courtneyanupam Garciasukhdeep BMD normal in 2008, and was repeated normally in 2016 due to an ankle fracture    Colonoscopy normal in 2015  Courtney Cristobal Had complex cysts in left breast in 2014, and appeared again in 2015 with US done  Last normal mammography in 12/16 , allowing       Now for yearly gyn visit with particular attention to status of vaginal cuff and healing process from extirpative surgery          The patient is being seen for a gynecology evaluation  General Health: The patient's health since the last visit is described as poor  Lifestyle:  She has weight concerns  She does not exercise regularly  She consumes alcohol  Reproductive health: the patient is postmenopausal    Screening: cancer screening reviewed and updated  Additional History:  See HPI for details  Review of Systems  Additional findings include See HPI  Constitutional: as noted in HPI  Breasts: as noted in HPI  Gastrointestinal: as noted in HPI  Genitourinary: as noted in HPI  Musculoskeletal: as noted in HPI  ROS reviewed  Physical Exam    Constitutional   General appearance: No acute distress, well appearing and well nourished  Quite bright, coping reasonably well , wig  Neck   Neck: Normal, supple, trachea midline, no masses  No adenopathy  Thyroid: Normal, no thyromegaly  Genitourinary   External genitalia: Normal and no lesions appreciated  Vagina: Abnormal   upper vagina with incision well healed, modest atrophic changes, no nodularity or surface lesion, pelvic sidewalls and cul de sac with no nodularity or lesion  Urethra: Normal     Urethral meatus: Normal     Bladder: Normal, soft, non-tender and no prolapse or masses appreciated  Cervix: Surgically absent  Uterus: Surgically absent  Adnexa/parametria: Surgically absent  Anus, perineum, and rectum: Normal sphincter tone, no masses, and no prolapse  Chest   Breasts: Normal and no dimpling or skin changes noted  Abdomen   Abdomen: Normal, non-tender, and no organomegaly noted  Wound healing well, no mass , organomegay nor tenderness  Liver and spleen: No hepatomegaly or splenomegaly  Examination for hernias: No hernias appreciated  Lymphatic   Palpation of lymph nodes in neck, axillae, groin and/or other locations: No lymphadenopathy or masses noted  Skin   Skin and subcutaneous tissue: Normal skin turgor and no rashes      Palpation of skin and subcutaneous tissue: Normal     Psychiatric   Orientation to person, place, and time: Normal     Mood and affect: Normal        Signatures   Electronically signed by : Michael Patton MD; Jan 24 2017  2:37PM EST                       (Author)

## 2018-01-17 NOTE — MISCELLANEOUS
Message  Return to work or school:   Jorge Marshall is under my professional care  She was seen in my office on 6/7/16       She may attend water aerobics          Signatures   Electronically signed by : Daisy Rosales, [facility]; Jun 7 2016  2:21PM EST                       (Author)

## 2018-01-18 NOTE — MISCELLANEOUS
Message   Recorded as Task   Date: 06/20/2016 08:39 AM, Created By: Santiago Lieberman   Task Name: Care Coordination   Assigned To: Denia Trevizo   Regarding Patient: Jocelyn Wright, Status: In Progress   Comment:    Genny Boo - 20 Jun 2016 8:39 AM     TASK CREATED  Caller: Self; Other; [phone] (Mobile Phone)  pt needs a gyn oncologist-Dr Ben Talley or Dr Devin Vazquez  She has beem diagnosed with mesothelioma and possible ovarian cancer  Dr Maylin Montero [facility] is referring her for this  She wants you to be aware and to know if you have a preference for either gyn oncologist  She is at her cell #[phone]  [facility] - 20 Jun 2016 10:06 AM     TASK IN PROGRESS   [facility] - 20 Jun 2016 10:09 AM     TASK EDITED  MATIASI Pt is going to see Dr Devin Vazquez this afternoon, pt wanted to make you aware  thank you  Bubba Franco - 21 Jun 2016 6:08 AM     TASK EDITED  Got message last evening, will call pt this AM  thanks  [facility]        Active Problems    1  Abnormal finding on mammography (793 80) (R92 8)   2  Benign essential hypertension (401 1) (I10)   3  Bilateral carpal tunnel syndrome (354 0) (G56 01,G56 02)   4  Breast cyst (610 0) (N60 09)   5  Carcinomatosis peritonei (197 6,199 1) (C78 6,C80 1)   6  Cervical disc herniation (722 0) (M50 20)   7  Cervical radiculopathy (723 4) (M54 12)   8  Cervical spinal stenosis (723 0) (M48 02)   9  Dorsodynia (724 5) (M54 9)   10  Encounter for routine gynecological examination (V72 31) (Z01 419)   11  Encounter for screening mammogram for malignant neoplasm of breast (V76 12)    (Z12 31)   12  Exposure to cigarette smoke (V87 39) (Z77 22)   13  History of Malloy's esophagus (V12 79) (Z87 19)   14  Hypercholesterolemia (272 0) (E78 0)   15  Hyperlipidemia (272 4) (E78 5)   16  Hypertension (401 9) (I10)   17  Malignant pleural effusion (511 81) (J91 0)   18  Mass of mouth or throat (784 2) (R22 0)   19  Pleural effusion (511 9) (J90)   20   Spondylosis of cervical region without myelopathy or radiculopathy (721 0) (M47 812)   21  Stress reaction, chronic (309 9) (F43 8)   22  Thyroid nodule (241 0) (E04 1)   23  Visit for screening mammogram ( 12) (Z12 31)    Current Meds   1  Aspir-Low 81 MG Oral Tablet Delayed Release; Take 1 tablet daily; Therapy: 93NDX4903- Recorded   2  Centrum Silver Oral Tablet; Take 1 tablet daily; Therapy: 88RSD6439- Recorded   3  Colace 50 MG CAPS; Take 1 capsule twice daily; Therapy: 33BXH8081 to Recorded   4  Cyclobenzaprine HCl - 5 MG Oral Tablet; TAKE 1 TABLET AT BEDTIME; Therapy: 68HLJ0382- Recorded   5  Gabapentin 300 MG Oral Capsule; Take 2 Capsules at Bedtime; Therapy: 46HFC4593 to (Last Rx:2016)  Requested for: 61EVN3454 Ordered   6  Hydrochlorothiazide 12 5 MG Oral Tablet; take 2 tablet daily; Therapy: (Recorded:52Ros8171) to Recorded   7  Klor-Con 10 10 MEQ Oral Tablet Extended Release; take 2 tablet daily; Therapy: (Recorded:06Xur6353) to Recorded   8  Lidoderm 5 % External Patch (Lidocaine); APPLY INCH  PRN; Therapy: 47JNK8130 to Recorded   9  LORazepam 0 5 MG Oral Tablet (Ativan); bid prn; Therapy: 12HTV8102 to (Last B04QIK2027) Ordered   10  Meloxicam 7 5 MG Oral Tablet; Take 1 tablet twice daily  Requested for: 2016; Last    Rx:2016 Ordered   11  NIFEdipine ER 60 MG Oral Tablet Extended Release 24 Hour (Adalat CC); TAKE 1    TABLET DAILY AS DIRECTED; Therapy: 47Elu4108 to (Evaluate:2017)  Requested for: 2016; Last    Rx:2016 Ordered   12  Omeprazole 20 MG Oral Capsule Delayed Release; Take 1 capsule twice daily     Requested for: 2016; Last Rx:2016 Ordered   13  Pravastatin Sodium 10 MG Oral Tablet; TAKE 1 TABLET DAILY; Therapy: 54YEQ8540 to (Evaluate:2016)  Requested for: 56OSX5194; Last    Rx:2015 Ordered   14  Red Yeast Rice 600 MG Oral Tablet; Take 1 tablet daily; Therapy: 77FBG6641 to Recorded   15   Spironolactone 50 MG Oral Tablet (Aldactone); ONE TAB DAILY; Therapy: 87Wpv6866 to (Last Rx:11Apr2016)  Requested for: 00Nyt9094 Ordered   16  Super B Complex/Vitamin C Oral Tablet; Take 1 tablet daily; Therapy: 97JIX4580 to Recorded   17  Toprol XL 50 MG Oral Tablet Extended Release 24 Hour (Metoprolol Succinate ER); Take    1 tablet daily; Therapy: 05ELR9237 to (Last Rx:12Jan2016)  Requested for: 12Jan2016 Ordered   18  Tramadol-Acetaminophen 37 5-325 MG Oral Tablet; TAKE 1 TABLET 3 TIMES DAILY AS    NEEDED; Therapy: 45IPH6270 to (Evaluate:07Jun2016); Last Rx:27Eem3978 Ordered   19  Valsartan 80 MG Oral Tablet; 1/2 tab ad; Therapy: 26CCP1948 to (Last Rx:15Jan2016)  Requested for: 85TXN3678 Ordered   20  Vitamin B-12 1000 MCG Oral Tablet; Take 1 tablet daily; Therapy: (([phone] 7410-0125) to Recorded   21  Vitamin B-6 100 MG Oral Tablet; Take 1 tablet daily; Therapy: 60ADD5606 to Recorded   22  Vitamin D 2000 UNIT Oral Capsule; take 1 capsule daily; Therapy: 12HQG1505 to Recorded    Allergies    1  Penicillins    2  Animal dander - Cats   3   No Known Food Allergies    Signatures   Electronically signed by : Codi Mejia LPN; Jun 21 0276  7:37DB EST                       (Author)